# Patient Record
Sex: MALE | Race: WHITE | URBAN - METROPOLITAN AREA
[De-identification: names, ages, dates, MRNs, and addresses within clinical notes are randomized per-mention and may not be internally consistent; named-entity substitution may affect disease eponyms.]

---

## 2023-06-01 ENCOUNTER — OFFICE VISIT (OUTPATIENT)
Dept: URBAN - METROPOLITAN AREA CLINIC 26 | Facility: CLINIC | Age: 73
End: 2023-06-01
Payer: MEDICARE

## 2023-06-01 DIAGNOSIS — H40.1134 PRIMARY OPEN-ANGLE GLAUCOMA, BILATERAL, INDETERMINATE STAGE: ICD-10-CM

## 2023-06-01 DIAGNOSIS — H04.123 DRY EYE SYNDROME OF BILATERAL LACRIMAL GLANDS: ICD-10-CM

## 2023-06-01 DIAGNOSIS — H00.021 HORDEOLUM INTERNUM RIGHT UPPER EYELID: Primary | ICD-10-CM

## 2023-06-01 PROCEDURE — 92133 CPTRZD OPH DX IMG PST SGM ON: CPT | Performed by: OPTOMETRIST

## 2023-06-01 PROCEDURE — 99204 OFFICE O/P NEW MOD 45 MIN: CPT | Performed by: OPTOMETRIST

## 2023-06-01 ASSESSMENT — INTRAOCULAR PRESSURE
OS: 19
OD: 20
OS: 24
OD: 17

## 2023-06-01 ASSESSMENT — VISUAL ACUITY
OS: 20/25
OD: 20/20

## 2023-06-01 ASSESSMENT — KERATOMETRY
OS: 46.88
OD: 41.88

## 2023-06-01 NOTE — IMPRESSION/PLAN
Impression: Hordeolum internum right upper eyelid: H00.021. Plan: recommend warm compress/ lid massage daily. Pt has hx of antibiotics GI sensitivity. recommend lid scrubs daily.

## 2023-06-01 NOTE — IMPRESSION/PLAN
Impression: Primary open-angle glaucoma, bilateral, indeterminate stage: H40.1134. S/P SLT x 3 OU. Plan: OCT of RNFL ordered and performed today ou. RNFL thinning OU. IOP slightly elevated today OU.  consider SLT in the future if IOP above 15. Continue Zioptan 1 gt QHS OU.  Pt ed on importance of drops and regular follow ups. schedule VF.  
f/u 2mns VF IOP PACHS

## 2023-06-07 ENCOUNTER — OFFICE VISIT (OUTPATIENT)
Dept: URBAN - METROPOLITAN AREA CLINIC 30 | Facility: CLINIC | Age: 73
End: 2023-06-07
Payer: MEDICARE

## 2023-06-07 DIAGNOSIS — H00.13 CHALAZION OF RIGHT EYELID: Primary | ICD-10-CM

## 2023-06-07 PROCEDURE — BEW15 BLEPHADEX EYELID WIPES15 CT: CUSTOM

## 2023-06-07 PROCEDURE — 99214 OFFICE O/P EST MOD 30 MIN: CPT

## 2023-06-07 RX ORDER — AMOXICILLIN 500 MG/1
500 MG TABLET, FILM COATED ORAL
Qty: 14 | Refills: 1 | Status: ACTIVE
Start: 2023-06-07

## 2023-06-07 ASSESSMENT — INTRAOCULAR PRESSURE
OS: 20
OD: 18

## 2023-06-07 NOTE — IMPRESSION/PLAN
Impression: Chalazion of right eyelid: H00.13. Plan: x 2. Recommend warm compress/ lid massage daily. Pt has hx of antibiotics GI sensitivity (Sulfa allergy, Ciprofloxacin, Tetracycline). Has tolerated Amoxicillin well in past, had issue with Clauvulanic acid. Begin Amoxicillin 500mg BID PO x 1 week. Frequent warm compresses + lid scrubs. F/u Geetha Lopez if does not resolve.

## 2023-07-18 ENCOUNTER — OFFICE VISIT (OUTPATIENT)
Dept: URBAN - METROPOLITAN AREA CLINIC 30 | Facility: CLINIC | Age: 73
End: 2023-07-18
Payer: MEDICARE

## 2023-07-18 DIAGNOSIS — H02.88B MGD OF LT EYE, UPPER AND LOWER EYELIDS: ICD-10-CM

## 2023-07-18 DIAGNOSIS — H01.024 SQUAMOUS BLEPHARITIS OF LEFT UPPER EYELID: ICD-10-CM

## 2023-07-18 DIAGNOSIS — H01.001 UNSPECIFIED BLEPARITIS OF RIGHT UPPER EYELID: ICD-10-CM

## 2023-07-18 DIAGNOSIS — H00.14 CHALAZION OF LEFT UPPER EYELID: Primary | ICD-10-CM

## 2023-07-18 DIAGNOSIS — H00.13 CHALAZION OF RIGHT EYELID: ICD-10-CM

## 2023-07-18 DIAGNOSIS — H40.1134 PRIMARY OPEN-ANGLE GLAUCOMA, BILATERAL, INDETERMINATE STAGE: ICD-10-CM

## 2023-07-18 DIAGNOSIS — H02.88A MGD OF RT EYE, UPPER AND LOWER EYELIDS: ICD-10-CM

## 2023-07-18 PROCEDURE — 99214 OFFICE O/P EST MOD 30 MIN: CPT | Performed by: OPTOMETRIST

## 2023-07-18 RX ORDER — NEOMYCIN SULFATE, POLYMYXIN B SULFATE AND DEXAMETHASONE 3.5; 10000; 1 MG/G; [USP'U]/G; MG/G
OINTMENT OPHTHALMIC
Qty: 3.5 | Refills: 1 | Status: ACTIVE
Start: 2023-07-18

## 2023-07-18 ASSESSMENT — INTRAOCULAR PRESSURE
OD: 16
OS: 16

## 2023-07-18 NOTE — IMPRESSION/PLAN
Impression: Chalazion of left upper eyelid: H00.14. Plan: Pt has hx of antibiotics GI sensitivity (Sulfa allergy, Ciprofloxacin, Tetracycline). Finished Amoxicillin 500mg BID PO x 10 days. 7/2023 expressed without complication PLAN: Frequent warm compresses + lid scrubs/lid massage daily. Start Maxitrol sunitha.

## 2023-07-18 NOTE — IMPRESSION/PLAN
Impression: Squamous blepharitis of left upper eyelid: H01.024. Plan: See other notes for correlations.

## 2023-07-18 NOTE — IMPRESSION/PLAN
Impression: MGD of rt eye, upper and lower eyelids: H02.88A. Plan: Discussed IPL briefly. Consider Erythromycin/ Maxitrol sunitha qhs OU to the lids. PLAN: WC's qhs OU.

## 2023-07-18 NOTE — IMPRESSION/PLAN
Impression: Unspecified bleparitis of right upper eyelid: H01.001. Plan: Using cliradex. Was told prev  demodex. Monitor.

## 2023-07-25 ENCOUNTER — OFFICE VISIT (OUTPATIENT)
Dept: URBAN - METROPOLITAN AREA CLINIC 30 | Facility: CLINIC | Age: 73
End: 2023-07-25
Payer: MEDICARE

## 2023-07-25 DIAGNOSIS — H00.14 CHALAZION OF LEFT UPPER EYELID: Primary | ICD-10-CM

## 2023-07-25 DIAGNOSIS — H01.001 UNSPECIFIED BLEPARITIS OF RIGHT UPPER EYELID: ICD-10-CM

## 2023-07-25 DIAGNOSIS — H02.88B MGD OF LT EYE, UPPER AND LOWER EYELIDS: ICD-10-CM

## 2023-07-25 DIAGNOSIS — F41.9 ANXIETY: ICD-10-CM

## 2023-07-25 DIAGNOSIS — H01.024 SQUAMOUS BLEPHARITIS OF LEFT UPPER EYELID: ICD-10-CM

## 2023-07-25 DIAGNOSIS — H40.1134 PRIMARY OPEN-ANGLE GLAUCOMA, BILATERAL, INDETERMINATE STAGE: ICD-10-CM

## 2023-07-25 DIAGNOSIS — H02.88A MGD OF RT EYE, UPPER AND LOWER EYELIDS: ICD-10-CM

## 2023-07-25 PROCEDURE — 92285 EXTERNAL OCULAR PHOTOGRAPHY: CPT | Performed by: OPHTHALMOLOGY

## 2023-07-25 PROCEDURE — 99204 OFFICE O/P NEW MOD 45 MIN: CPT | Performed by: OPHTHALMOLOGY

## 2023-07-25 ASSESSMENT — INTRAOCULAR PRESSURE
OS: 17
OD: 16

## 2023-07-31 ENCOUNTER — PROCEDURE (OUTPATIENT)
Dept: URBAN - METROPOLITAN AREA CLINIC 30 | Facility: CLINIC | Age: 73
End: 2023-07-31
Payer: MEDICARE

## 2023-07-31 DIAGNOSIS — Z96.1 PRESENCE OF INTRAOCULAR LENS: ICD-10-CM

## 2023-07-31 DIAGNOSIS — H00.14 CHALAZION OF LEFT UPPER EYELID: Primary | ICD-10-CM

## 2023-07-31 PROCEDURE — 99213 OFFICE O/P EST LOW 20 MIN: CPT | Performed by: OPHTHALMOLOGY

## 2023-07-31 PROCEDURE — 92285 EXTERNAL OCULAR PHOTOGRAPHY: CPT | Performed by: OPHTHALMOLOGY

## 2023-07-31 ASSESSMENT — INTRAOCULAR PRESSURE
OS: 17
OD: 16

## 2024-06-05 ENCOUNTER — OFFICE VISIT (OUTPATIENT)
Dept: URBAN - METROPOLITAN AREA CLINIC 26 | Facility: CLINIC | Age: 74
End: 2024-06-05
Payer: MEDICARE

## 2024-06-05 DIAGNOSIS — H01.009 BLEPHARITIS OF EYELID: ICD-10-CM

## 2024-06-05 DIAGNOSIS — H04.123 DRY EYE SYNDROME OF BILATERAL LACRIMAL GLANDS: ICD-10-CM

## 2024-06-05 DIAGNOSIS — H40.1134 PRIMARY OPEN-ANGLE GLAUCOMA, BILATERAL, INDETERMINATE STAGE: Primary | ICD-10-CM

## 2024-06-05 PROCEDURE — 92014 COMPRE OPH EXAM EST PT 1/>: CPT | Performed by: OPTOMETRIST

## 2024-06-05 PROCEDURE — 92133 CPTRZD OPH DX IMG PST SGM ON: CPT | Performed by: OPTOMETRIST

## 2024-06-05 RX ORDER — TAFLUPROST 0.01 MG/ML
0.0015 % SOLUTION/ DROPS OPHTHALMIC
Qty: 160 | Refills: 2 | Status: ACTIVE
Start: 2024-06-05

## 2024-06-05 ASSESSMENT — INTRAOCULAR PRESSURE
OD: 22
OS: 24

## 2024-08-09 ENCOUNTER — OFFICE VISIT (OUTPATIENT)
Dept: URBAN - METROPOLITAN AREA CLINIC 30 | Facility: CLINIC | Age: 74
End: 2024-08-09
Payer: MEDICARE

## 2024-08-09 DIAGNOSIS — H40.1134 PRIMARY OPEN-ANGLE GLAUCOMA, BILATERAL, INDETERMINATE STAGE: Primary | ICD-10-CM

## 2024-08-09 PROCEDURE — 76514 ECHO EXAM OF EYE THICKNESS: CPT | Performed by: OPTOMETRIST

## 2024-08-09 PROCEDURE — 99214 OFFICE O/P EST MOD 30 MIN: CPT | Performed by: OPTOMETRIST

## 2024-08-09 PROCEDURE — 92083 EXTENDED VISUAL FIELD XM: CPT | Performed by: OPTOMETRIST

## 2024-08-09 RX ORDER — DEXMETHYLPHENIDATE HYDROCHLORIDE 10 MG/1
10 MG TABLET ORAL
Qty: 0 | Refills: 0 | Status: ACTIVE
Start: 2024-08-09

## 2024-08-09 ASSESSMENT — INTRAOCULAR PRESSURE
OS: 25
OD: 23
OD: 21